# Patient Record
Sex: FEMALE
[De-identification: names, ages, dates, MRNs, and addresses within clinical notes are randomized per-mention and may not be internally consistent; named-entity substitution may affect disease eponyms.]

---

## 2017-01-06 ENCOUNTER — HOSPITAL ENCOUNTER (OUTPATIENT)
Dept: HOSPITAL 5 - SPVWC | Age: 57
Discharge: HOME | End: 2017-01-06
Attending: INTERNAL MEDICINE
Payer: COMMERCIAL

## 2017-01-06 DIAGNOSIS — R94.5: Primary | ICD-10-CM

## 2017-01-06 PROCEDURE — 76705 ECHO EXAM OF ABDOMEN: CPT

## 2017-01-06 NOTE — ULTRASOUND REPORT
RIGHT UPPER QUADRANT ABDOMINAL ULTRASOUND: 01/06/17 11:18:00



CLINICAL: Elevated liver enzymes.





FINDINGS: High-resolution ultrasound demonstrated a normal size liver 

with normal contour and echogenicity.  No liver mass..  Normal hepatic 

vasculature and inferior vena cava. Normal gallbladder and bile ducts.  

The gall bladder wall measures 1.2 mm in thickness.    The common bile 

duct measures 4.0 mm diameter.  Normal head and body of the pancreas.  

The pancreatic tail is obscured by bowel gas.  Normal upper abdominal 

aorta. The right kidney is normal and  measures cm.  No ascites or mass.



IMPRESSION: Normal study.

## 2022-04-15 ENCOUNTER — OFFICE VISIT (OUTPATIENT)
Dept: URBAN - METROPOLITAN AREA SURGERY CENTER 16 | Facility: SURGERY CENTER | Age: 62
End: 2022-04-15

## 2022-08-05 ENCOUNTER — OFFICE VISIT (OUTPATIENT)
Dept: URBAN - METROPOLITAN AREA SURGERY CENTER 16 | Facility: SURGERY CENTER | Age: 62
End: 2022-08-05
Payer: OTHER GOVERNMENT

## 2022-08-05 DIAGNOSIS — Z12.11 COLON CANCER SCREENING: ICD-10-CM

## 2022-08-05 PROCEDURE — 45378 DIAGNOSTIC COLONOSCOPY: CPT | Performed by: INTERNAL MEDICINE

## 2022-08-05 PROCEDURE — G8907 PT DOC NO EVENTS ON DISCHARG: HCPCS | Performed by: INTERNAL MEDICINE

## 2025-07-30 ENCOUNTER — TELEPHONE ENCOUNTER (OUTPATIENT)
Dept: URBAN - METROPOLITAN AREA CLINIC 23 | Facility: CLINIC | Age: 65
End: 2025-07-30